# Patient Record
Sex: FEMALE | Race: WHITE | Employment: FULL TIME | URBAN - METROPOLITAN AREA
[De-identification: names, ages, dates, MRNs, and addresses within clinical notes are randomized per-mention and may not be internally consistent; named-entity substitution may affect disease eponyms.]

---

## 2022-04-19 PROBLEM — Z86.2 HISTORY OF ANEMIA: Status: ACTIVE | Noted: 2022-04-19

## 2022-04-19 PROBLEM — D25.9 FIBROID UTERUS: Status: ACTIVE | Noted: 2022-04-19

## 2022-06-09 PROBLEM — S92.301A CLOSED AVULSION FRACTURE OF METATARSAL BONE OF RIGHT FOOT: Status: ACTIVE | Noted: 2022-06-09

## 2022-12-12 ENCOUNTER — TELEPHONE (OUTPATIENT)
Dept: INTERNAL MEDICINE CLINIC | Age: 42
End: 2022-12-12

## 2022-12-12 NOTE — TELEPHONE ENCOUNTER
Patient is calling to see if she can be seen by Dr. Matt Jones. She states she has had swollen tonsils, cough, chest congestion, and some difficulty breathing for the past month. She has not taken a COVID test. Patient believes she should be seen in person for her sxs. She is currently scheduled as a VV Thursday. Please advise. She will call back with at-home COVID test results. Aware Dr. Matt Jones is not in office today.

## 2023-01-04 PROBLEM — R92.8 ABNORMAL ULTRASOUND OF BREAST: Status: ACTIVE | Noted: 2023-01-04

## 2023-02-08 ENCOUNTER — HOSPITAL ENCOUNTER (OUTPATIENT)
Dept: ULTRASOUND IMAGING | Age: 43
Discharge: HOME | End: 2023-02-08
Payer: COMMERCIAL

## 2023-02-08 ENCOUNTER — ANCILLARY ORDERS (OUTPATIENT)
Dept: INTERNAL MEDICINE CLINIC | Age: 43
End: 2023-02-08

## 2023-02-08 DIAGNOSIS — R92.8 ABNORMAL ULTRASOUND OF BREAST: ICD-10-CM

## 2023-02-08 PROCEDURE — 76642 ULTRASOUND BREAST LIMITED: CPT

## 2023-05-03 ENCOUNTER — TELEPHONE (OUTPATIENT)
Dept: INTERNAL MEDICINE CLINIC | Age: 43
End: 2023-05-03

## 2023-05-03 NOTE — PROGRESS NOTES
Date of Visit:  2023    CC: Monse López (: 1980) is a 44 y.o. female, established patient, here for evaluation/re-evaluation of the following medical concerns:    ASSESSMENT/PLAN:  Impacted cerumen of left ear  Referred to ENT for removal.  -     Felicia Galeano, , Otolaryngology, [place]  Seasonal allergic rhinitis, unspecified trigger  Likely causing some eustachian tube dysfunction and post-nasal drip. Recommended Claritin and/or Flonase. She will call if purulent nasal discharge or sputum develops, or if condition otherwise worsens. Return if symptoms worsen or fail to improve. Vitals:    23 1506   BP: 106/72   Site: Right Upper Arm   Position: Sitting   Cuff Size: Medium Adult   Pulse: 64   Temp: 97.8 °F (36.6 °C)   SpO2: 98%   Weight: 163 lb (73.9 kg)      Estimated body mass index is 26.31 kg/m² as calculated from the following:    Height as of 22: 5' 6\" (1.676 m). Weight as of this encounter: 163 lb (73.9 kg). Wt Readings from Last 3 Encounters:   23 163 lb (73.9 kg)   22 161 lb 12.8 oz (73.4 kg)   22 155 lb (70.3 kg)     BP Readings from Last 3 Encounters:   23 106/72   22 106/70   07/15/22 116/67     HPI  Respiratory Symptoms:  Patient complains of 1 month(s) history of ear discomfort, PND, mild cough- clear. Symptoms have been unchanged with time. She denies any other symptoms . Relevant PMH: No pertinent PMH. Smoking history:  She  reports that she has never smoked. She has never used smokeless tobacco., vaping flavored nicotine. She has had no known ill contacts. Treatment to date: none. ROS  As documented above    Physical Exam  Constitutional:       General: She is not in acute distress. Appearance: She is well-developed. HENT:      Right Ear: Tympanic membrane, ear canal and external ear normal.      Left Ear: External ear normal. There is impacted cerumen.       Mouth/Throat:      Mouth: Mucous

## 2023-05-03 NOTE — TELEPHONE ENCOUNTER
Would it be possible to add patient on this Friday for in office visit?  Please advise on what time     ----- Message from Matt Larios sent at 5/3/2023 12:35 PM EDT -----  Subject: Appointment Request    Reason for Call: Established Patient Appointment needed: Semi-Routine Ear   Problem    QUESTIONS    Reason for appointment request? No appointments available during search     Additional Information for Provider? patient is having Ear ache wanted to   have in person apt with Doctor no virtual.   ---------------------------------------------------------------------------  --------------  Brittney Arango INFO  [phone]; OK to leave message on voicemail  ---------------------------------------------------------------------------  --------------  SCRIPT ANSWERS  COVID Screen: Kailey White

## 2023-05-05 ENCOUNTER — OFFICE VISIT (OUTPATIENT)
Dept: INTERNAL MEDICINE CLINIC | Age: 43
End: 2023-05-05
Payer: COMMERCIAL

## 2023-05-05 VITALS
TEMPERATURE: 97.8 F | WEIGHT: 163 LBS | HEART RATE: 64 BPM | BODY MASS INDEX: 26.31 KG/M2 | DIASTOLIC BLOOD PRESSURE: 72 MMHG | OXYGEN SATURATION: 98 % | SYSTOLIC BLOOD PRESSURE: 106 MMHG

## 2023-05-05 DIAGNOSIS — J30.2 SEASONAL ALLERGIC RHINITIS, UNSPECIFIED TRIGGER: ICD-10-CM

## 2023-05-05 DIAGNOSIS — H61.22 IMPACTED CERUMEN OF LEFT EAR: Primary | ICD-10-CM

## 2023-05-05 PROCEDURE — 99213 OFFICE O/P EST LOW 20 MIN: CPT | Performed by: INTERNAL MEDICINE

## 2023-05-05 RX ORDER — ESCITALOPRAM OXALATE 10 MG/1
10 TABLET ORAL DAILY
COMMUNITY

## 2023-05-05 ASSESSMENT — PATIENT HEALTH QUESTIONNAIRE - PHQ9
1. LITTLE INTEREST OR PLEASURE IN DOING THINGS: 0
SUM OF ALL RESPONSES TO PHQ QUESTIONS 1-9: 0
6. FEELING BAD ABOUT YOURSELF - OR THAT YOU ARE A FAILURE OR HAVE LET YOURSELF OR YOUR FAMILY DOWN: 0
4. FEELING TIRED OR HAVING LITTLE ENERGY: 0
2. FEELING DOWN, DEPRESSED OR HOPELESS: 0
10. IF YOU CHECKED OFF ANY PROBLEMS, HOW DIFFICULT HAVE THESE PROBLEMS MADE IT FOR YOU TO DO YOUR WORK, TAKE CARE OF THINGS AT HOME, OR GET ALONG WITH OTHER PEOPLE: 0
SUM OF ALL RESPONSES TO PHQ QUESTIONS 1-9: 0
SUM OF ALL RESPONSES TO PHQ9 QUESTIONS 1 & 2: 0
3. TROUBLE FALLING OR STAYING ASLEEP: 0
7. TROUBLE CONCENTRATING ON THINGS, SUCH AS READING THE NEWSPAPER OR WATCHING TELEVISION: 0
8. MOVING OR SPEAKING SO SLOWLY THAT OTHER PEOPLE COULD HAVE NOTICED. OR THE OPPOSITE, BEING SO FIGETY OR RESTLESS THAT YOU HAVE BEEN MOVING AROUND A LOT MORE THAN USUAL: 0
SUM OF ALL RESPONSES TO PHQ QUESTIONS 1-9: 0
9. THOUGHTS THAT YOU WOULD BE BETTER OFF DEAD, OR OF HURTING YOURSELF: 0
SUM OF ALL RESPONSES TO PHQ QUESTIONS 1-9: 0
5. POOR APPETITE OR OVEREATING: 0

## 2023-05-18 ENCOUNTER — OFFICE VISIT (OUTPATIENT)
Dept: ENT CLINIC | Age: 43
End: 2023-05-18

## 2023-05-18 VITALS
BODY MASS INDEX: 25.26 KG/M2 | HEIGHT: 66 IN | SYSTOLIC BLOOD PRESSURE: 115 MMHG | HEART RATE: 109 BPM | TEMPERATURE: 97.2 F | WEIGHT: 157.2 LBS | DIASTOLIC BLOOD PRESSURE: 78 MMHG

## 2023-05-18 DIAGNOSIS — H93.8X2 FULLNESS IN EAR, LEFT: Primary | ICD-10-CM

## 2023-05-18 DIAGNOSIS — H61.22 IMPACTED CERUMEN OF LEFT EAR: ICD-10-CM

## 2023-05-18 PROCEDURE — 99203 OFFICE O/P NEW LOW 30 MIN: CPT | Performed by: OTOLARYNGOLOGY

## 2023-05-18 PROCEDURE — 69210 REMOVE IMPACTED EAR WAX UNI: CPT | Performed by: OTOLARYNGOLOGY

## 2023-05-18 RX ORDER — MAGNESIUM GLUCONATE 27 MG(500)
500 TABLET ORAL 2 TIMES DAILY
COMMUNITY

## 2023-05-18 NOTE — PROGRESS NOTES
CHIEF COMPLAINT: Fullness in the right ear    HISTORY OF PRESENT ILLNESS:  44 y.o. female who presents with fullness in the right ear of 4 to 6 weeks duration. No ear pain or otorrhea. Hearing is muffled. Onset not related to upper respiratory infection. PAST MEDICAL HISTORY:   Social History     Tobacco Use   Smoking Status Never   Smokeless Tobacco Never                                                    Social History     Substance and Sexual Activity   Alcohol Use Never                                                    Current Outpatient Medications:     escitalopram (LEXAPRO) 10 MG tablet, Take 1 tablet by mouth daily, Disp: , Rfl:     Prenatal Vit-Fe Fumarate-FA (PRENATAL VITAMIN) 27-1 MG TABS tablet, , Disp: , Rfl:     Cholecalciferol (VITAMIN D) 50 MCG (2000 UT) CAPS capsule, Take by mouth, Disp: , Rfl:     folic acid (FOLVITE) 692 MCG tablet, Take 1 tablet by mouth daily, Disp: , Rfl:                                                  Past Medical History:   Diagnosis Date    Anxiety     Depression     Fibroid uterus     Hypothyroidism due to acquired atrophy of thyroid     MTHFR mutation (methylenetetrahydrofolate reductase)     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 Except as noted in history of present illness, there is no pertinent family history      REVIEW OF SYSTEMS:  All pertinent positive and negative review of systems included in HPI. Otherwise, all systems are reviewed and negative.     PHYSICAL EXAMINATION:   GENERAL: wdwn- no acute distress  RESPIRATORY:  No stridor or respiratory distress  COMMUNICATION :  Normal voice  MENTAL STATUS:  Mood and affect normal, oriented X 3  HEAD AND FACE:  No abnormalities of the skin of